# Patient Record
Sex: MALE | Race: BLACK OR AFRICAN AMERICAN | Employment: FULL TIME | URBAN - METROPOLITAN AREA
[De-identification: names, ages, dates, MRNs, and addresses within clinical notes are randomized per-mention and may not be internally consistent; named-entity substitution may affect disease eponyms.]

---

## 2022-08-30 ENCOUNTER — OFFICE VISIT (OUTPATIENT)
Dept: PRIMARY CARE CLINIC | Age: 42
End: 2022-08-30
Payer: COMMERCIAL

## 2022-08-30 ENCOUNTER — HOSPITAL ENCOUNTER (OUTPATIENT)
Dept: GENERAL RADIOLOGY | Age: 42
Discharge: HOME | End: 2022-08-30
Attending: FAMILY MEDICINE
Payer: COMMERCIAL

## 2022-08-30 VITALS
RESPIRATION RATE: 16 BRPM | WEIGHT: 210.1 LBS | HEIGHT: 68 IN | OXYGEN SATURATION: 100 % | SYSTOLIC BLOOD PRESSURE: 130 MMHG | TEMPERATURE: 97.5 F | HEART RATE: 92 BPM | DIASTOLIC BLOOD PRESSURE: 90 MMHG | BODY MASS INDEX: 31.84 KG/M2

## 2022-08-30 DIAGNOSIS — M25.562 ACUTE PAIN OF LEFT KNEE: ICD-10-CM

## 2022-08-30 DIAGNOSIS — M25.562 ACUTE PAIN OF LEFT KNEE: Primary | ICD-10-CM

## 2022-08-30 PROCEDURE — 99202 OFFICE O/P NEW SF 15 MIN: CPT | Performed by: FAMILY MEDICINE

## 2022-08-30 PROCEDURE — 73562 X-RAY EXAM OF KNEE 3: CPT

## 2022-08-30 NOTE — PROGRESS NOTES
HPI     Chief Complaint   Patient presents with    Establish Care    Other     Pressure in right knee     He is a 42 y.o. male who presents to [facility]. Here today c/o left knee discomfort. Describes this as pressure inside his left knee. Worse with climbing stairs. No injury or change in activities. Has not tried any therapies. Worsens with any knee flexion or prolonged activities. Fhx : HTN, DM in both parents. Brother has HTN. Surghx : denies. Social hx : Denies tobacco or tobacco use. Establishing Care  - Chronic medical problems:  Past Medical History:   Diagnosis Date    Bell's palsy 8-     - Current medications:   No current outpatient medications on file. No current facility-administered medications for this visit. - Family history:   Family History   Problem Relation Age of Onset    Hypertension Mother     Diabetes Father     Diabetes Maternal Grandmother     Heart Disease Maternal Grandfather     Stroke Maternal Grandfather      - Allergies: No Known Allergies  - Surgical history: No past surgical history on file.   - Social history (sexually active, occupation, smoker, etoh use, etc):   Social History     Socioeconomic History    Marital status:      Spouse name: Not on file    Number of children: Not on file    Years of education: Not on file    Highest education level: Not on file   Occupational History    Not on file   Tobacco Use    Smoking status: Never    Smokeless tobacco: Never   Vaping Use    Vaping Use: Never used   Substance and Sexual Activity    Alcohol use: Yes    Drug use: No    Sexual activity: Yes     Partners: Female     Birth control/protection: None   Other Topics Concern    Not on file   Social History Narrative    Not on file     Social Determinants of Health     Financial Resource Strain: Low Risk     Difficulty of Paying Living Expenses: Not hard at all   Food Insecurity: No Food Insecurity    Worried About [facility] in the Last Year: Never true    Ran Out of Food in the Last Year: Never true   Transportation Needs: Not on file   Physical Activity: Not on file   Stress: Not on file   Social Connections: Not on file   Intimate Partner Violence: Not on file   Housing Stability: Not on file         Review of Systems  Denies fever, chills, chest pain, shortness of breath, abd pain, nausea, vomiting    Reviewed PmHx, RxHx, FmHx, SocHx, AllgHx and updated and dated in the chart. Physical Exam:  Visit Vitals  BP (!) 130/90   Pulse 92   Temp 97.5 °F (36.4 °C) (Temporal)   Resp 16   Ht 5' 8\" (1.727 m)   Wt 210 lb 1.6 oz (95.3 kg)   SpO2 100%   BMI 31.95 kg/m²     Physical Exam  Vitals reviewed. Constitutional:       Appearance: Normal appearance. Cardiovascular:      Rate and Rhythm: Normal rate and regular rhythm. Pulses: Normal pulses. Heart sounds: Normal heart sounds. Pulmonary:      Effort: Pulmonary effort is normal.      Breath sounds: Normal breath sounds. Musculoskeletal:      Comments: Bilat Knee exam : ROM intact. No appreciated effusion or instability. Mild tenderness inferior to left medial knee joint. No other appreciated tenderness. Skin intact and normal appearing. No LE edema or calf tenderness. LE strength and sensation to light touch intact. Skin:     General: Skin is warm and dry. Neurological:      Mental Status: He is alert. Assessment / Plan     Diagnoses and all orders for this visit:    1. Acute pain of left knee  -     XR KNEE LT MAX 2 VWS; Future  -     REFERRAL TO PHYSICAL THERAPY   Exercise handout provided. Advised daily exercises, supportive shoe wear, applied ice TID. Xray and follow up as indicated. Follow up with ortho if not improved within the next 2-4 weeks. I have discussed the diagnosis with the patient and the intended plan as seen in the above orders.    Fozia Chinchilla,

## 2022-08-30 NOTE — PROGRESS NOTES
Chief Complaint   Patient presents with    Establish Care    Other     Pressure in right knee     3 most recent PHQ Screens 8/30/2022   Little interest or pleasure in doing things Not at all   Feeling down, depressed, irritable, or hopeless Not at all   Total Score PHQ 2 0     Abuse Screening Questionnaire 8/30/2022   Do you ever feel afraid of your partner? N   Are you in a relationship with someone who physically or mentally threatens you? N   Is it safe for you to go home? Y     Visit Vitals  BP (!) 130/90   Pulse 92   Temp 97.5 °F (36.4 °C) (Temporal)   Resp 16   Ht 5' 8\" (1.727 m)   Wt 210 lb 1.6 oz (95.3 kg)   SpO2 100%   BMI 31.95 kg/m²     1. \"Have you been to the ER, urgent care clinic since your last visit? Hospitalized since your last visit? \" no    2. \"Have you seen or consulted any other health care providers outside of the [address] [place] [address] since your last visit? \" no

## 2025-07-07 LAB — HBA1C MFR BLD HPLC: 11.1 %

## 2025-07-25 ENCOUNTER — OFFICE VISIT (OUTPATIENT)
Age: 45
End: 2025-07-25
Payer: COMMERCIAL

## 2025-07-25 VITALS
DIASTOLIC BLOOD PRESSURE: 86 MMHG | BODY MASS INDEX: 29.86 KG/M2 | WEIGHT: 197 LBS | RESPIRATION RATE: 16 BRPM | HEART RATE: 82 BPM | OXYGEN SATURATION: 97 % | HEIGHT: 68 IN | TEMPERATURE: 97.6 F | SYSTOLIC BLOOD PRESSURE: 120 MMHG

## 2025-07-25 DIAGNOSIS — E11.9 CONTROLLED TYPE 2 DIABETES MELLITUS WITHOUT COMPLICATION, WITHOUT LONG-TERM CURRENT USE OF INSULIN (HCC): ICD-10-CM

## 2025-07-25 DIAGNOSIS — Z11.59 NEED FOR HEPATITIS C SCREENING TEST: ICD-10-CM

## 2025-07-25 DIAGNOSIS — Z12.11 SCREENING FOR COLON CANCER: ICD-10-CM

## 2025-07-25 DIAGNOSIS — E78.00 PURE HYPERCHOLESTEROLEMIA: ICD-10-CM

## 2025-07-25 DIAGNOSIS — Z11.4 SCREENING FOR HIV WITHOUT PRESENCE OF RISK FACTORS: ICD-10-CM

## 2025-07-25 DIAGNOSIS — Z76.89 ENCOUNTER TO ESTABLISH CARE: Primary | ICD-10-CM

## 2025-07-25 PROCEDURE — 99204 OFFICE O/P NEW MOD 45 MIN: CPT | Performed by: INTERNAL MEDICINE

## 2025-07-25 PROCEDURE — G2211 COMPLEX E/M VISIT ADD ON: HCPCS | Performed by: INTERNAL MEDICINE

## 2025-07-25 RX ORDER — ROSUVASTATIN CALCIUM 10 MG/1
10 TABLET, COATED ORAL DAILY
Qty: 90 TABLET | Refills: 1 | Status: SHIPPED | OUTPATIENT
Start: 2025-07-25

## 2025-07-25 RX ORDER — METFORMIN HYDROCHLORIDE 500 MG/1
500 TABLET, EXTENDED RELEASE ORAL
COMMUNITY
Start: 2025-07-15

## 2025-07-25 RX ORDER — ACYCLOVIR 400 MG/1
TABLET ORAL
COMMUNITY

## 2025-07-25 SDOH — ECONOMIC STABILITY: FOOD INSECURITY: WITHIN THE PAST 12 MONTHS, YOU WORRIED THAT YOUR FOOD WOULD RUN OUT BEFORE YOU GOT MONEY TO BUY MORE.: NEVER TRUE

## 2025-07-25 SDOH — ECONOMIC STABILITY: FOOD INSECURITY: WITHIN THE PAST 12 MONTHS, THE FOOD YOU BOUGHT JUST DIDN'T LAST AND YOU DIDN'T HAVE MONEY TO GET MORE.: NEVER TRUE

## 2025-07-25 ASSESSMENT — PATIENT HEALTH QUESTIONNAIRE - PHQ9
1. LITTLE INTEREST OR PLEASURE IN DOING THINGS: NOT AT ALL
2. FEELING DOWN, DEPRESSED OR HOPELESS: NOT AT ALL
SUM OF ALL RESPONSES TO PHQ QUESTIONS 1-9: 0

## 2025-07-25 ASSESSMENT — ENCOUNTER SYMPTOMS
SHORTNESS OF BREATH: 0
COUGH: 0

## 2025-07-25 NOTE — PROGRESS NOTES
1. Have you been to the ER, urgent care clinic since your last visit?  Hospitalized since your last visit? [facility]    2. Have you seen or consulted any other health care providers outside of the [facility] since your last visit?  Include any pap smears or colon screening. No    Chief Complaint   Patient presents with    New Patient     Currently wearing Dexcom 7   Told by [facility] two weeks ago A1c about 11.      Establish Care     Health Maintenance Due   Topic Date Due    HIV screen  Never done    Hepatitis C screen  Never done    Hepatitis B vaccine (1 of 3 - 19+ 3-dose series) Never done    DTaP/Tdap/Td vaccine (1 - Tdap) Never done    Diabetes screen  Never done    Lipids  Never done    COVID-19 Vaccine (1 - 2024-25 season) Never done    Colorectal Cancer Screen  Never done     PHQ-9 Total Score: 0 (7/25/2025 10:39 AM)        7/25/2025    10:00 AM   AMB Abuse Screening   Do you ever feel afraid of your partner? N   Are you in a relationship with someone who physically or mentally threatens you? N   Is it safe for you to go home? Y     Vitals:    07/25/25 1038   BP: (!) 143/96   Pulse: 82   Resp: 16   Temp: 97.6 °F (36.4 °C)   SpO2: 97%

## 2025-07-25 NOTE — PROGRESS NOTES
Ze Salgado is a 44 y.o. male  Chief Complaint   Patient presents with    New Patient     Currently wearing Dexcom 7   Told by [facility] two weeks ago A1c about 11.      Establish Care       Vitals:    07/25/25 1136   BP: 120/86   Pulse:    Resp:    Temp:    SpO2:           HPI  Mr. Salgado is a 44-year-old male with no significant past medical history presents to [facility] following a recent diagnosis of diabetes mellitus identified during an insurance health screening.  He brought blood work results from July 7 showing:  A1c: 11.1%  LDL: 145 mg/dL  High-sensitivity CRP: 3.1 mg/L  C-peptide: 3.0 ng/mL  He was started on metformin and has been monitoring his glucose levels with a Dexcom continuous glucose monitor. Since starting metformin, his glucose levels have improved from a range of 199 mg/dL down to the 150-160 mg/dL range.  Currently, his glucose readings are within target range approximately 75% of the time, with very high glucose levels (<1% of the time) and high glucose about 24% of the time.  He denies any other complaints, including chest pain or shortness of breath.  Family history is notable for:  Both parents with diabetes and hypertension  Twin brother with diabetes and hypertension, currently on metformin  He reports no recent stressors or new medications prior to diagnosis.  Today, his blood pressure was 140/96 mmHg, which improved to 120/86 mmHg upon rechecking.    He doesn't smoke, drinks infrequently.     Past Medical History:   Diagnosis Date    Bell's palsy 8-            ROS  Review of Systems   Constitutional:  Negative for fever.   Respiratory:  Negative for cough and shortness of breath.    Cardiovascular:  Negative for chest pain and palpitations.   Neurological:  Negative for headaches.   Psychiatric/Behavioral:  Negative for dysphoric mood.            EXAM  Physical Exam  Vitals and nursing note reviewed.   HENT:      Head: Normocephalic and atraumatic.   Cardiovascular: